# Patient Record
Sex: FEMALE | Race: ASIAN | NOT HISPANIC OR LATINO
[De-identification: names, ages, dates, MRNs, and addresses within clinical notes are randomized per-mention and may not be internally consistent; named-entity substitution may affect disease eponyms.]

---

## 2020-02-21 PROBLEM — Z00.00 ENCOUNTER FOR PREVENTIVE HEALTH EXAMINATION: Status: ACTIVE | Noted: 2020-02-21

## 2020-02-28 ENCOUNTER — APPOINTMENT (OUTPATIENT)
Dept: OTOLARYNGOLOGY | Facility: CLINIC | Age: 25
End: 2020-02-28
Payer: COMMERCIAL

## 2020-02-28 VITALS
BODY MASS INDEX: 20.16 KG/M2 | DIASTOLIC BLOOD PRESSURE: 85 MMHG | HEIGHT: 65 IN | SYSTOLIC BLOOD PRESSURE: 130 MMHG | TEMPERATURE: 98.2 F | HEART RATE: 67 BPM | WEIGHT: 121 LBS | OXYGEN SATURATION: 98 %

## 2020-02-28 DIAGNOSIS — H61.20 IMPACTED CERUMEN, UNSPECIFIED EAR: ICD-10-CM

## 2020-02-28 PROCEDURE — 99203 OFFICE O/P NEW LOW 30 MIN: CPT | Mod: 25

## 2020-02-28 PROCEDURE — 69210 REMOVE IMPACTED EAR WAX UNI: CPT

## 2020-02-28 NOTE — HISTORY OF PRESENT ILLNESS
[de-identified] : 24F who is seen for cerumen impaction, who presents for evaluation of cerumen impaction. PAtient denies any hearing loss, tinnitus, otorrhea, dizziness. She denies any other ENT issues. No pertinent SH/FH.

## 2020-02-28 NOTE — PHYSICAL EXAM
[de-identified] : bilateral cerumen impacted and obstructing the TMs, removed [Midline] : trachea located in midline position [Normal] : no rashes

## 2023-04-02 ENCOUNTER — NON-APPOINTMENT (OUTPATIENT)
Age: 28
End: 2023-04-02

## 2023-04-03 ENCOUNTER — NON-APPOINTMENT (OUTPATIENT)
Age: 28
End: 2023-04-03

## 2023-04-03 ENCOUNTER — APPOINTMENT (OUTPATIENT)
Dept: OTOLARYNGOLOGY | Facility: CLINIC | Age: 28
End: 2023-04-03
Payer: COMMERCIAL

## 2023-04-03 ENCOUNTER — TRANSCRIPTION ENCOUNTER (OUTPATIENT)
Age: 28
End: 2023-04-03

## 2023-04-03 VITALS
WEIGHT: 120 LBS | DIASTOLIC BLOOD PRESSURE: 77 MMHG | OXYGEN SATURATION: 98 % | TEMPERATURE: 98 F | SYSTOLIC BLOOD PRESSURE: 117 MMHG | HEART RATE: 74 BPM | BODY MASS INDEX: 19.99 KG/M2 | HEIGHT: 65 IN

## 2023-04-03 DIAGNOSIS — H61.23 IMPACTED CERUMEN, BILATERAL: ICD-10-CM

## 2023-04-03 PROCEDURE — 99203 OFFICE O/P NEW LOW 30 MIN: CPT | Mod: 25

## 2023-04-03 PROCEDURE — 69210 REMOVE IMPACTED EAR WAX UNI: CPT

## 2023-04-03 NOTE — PHYSICAL EXAM
[de-identified] :   Bilateral cerumen impaction cleaned away without issue [Midline] : trachea located in midline position [Normal] : no rashes

## 2023-04-03 NOTE — PROCEDURE
[FreeTextEntry3] : -\par Cerumen Removal/Ear Cleaning for Otitis Externa\par Pre-operative Diagnosis: bilateral Cerumen Impaction\par Post-operative Diagnosis: Same\par Procedure:  Binocular microscopy with cerumen removal- [id number]\par Procedure Details:  \par The patient was placed in the supine position.  The operating microscope was positioned.  I then placed the ear speculum in the EAC.  Cerumen was then removed using a mixture of otologic curettes, and suction.  The TM was noted to be intact. I then performed the procedure of the opposite ear in similar fashion.  The patient tolerated procedure well.\par \par Findings: \par Bilateral Ear Canal - normal\par Bilateral Tympanic Membrane - normal\par \par Recommendations: Debrox\par Complications: None\par \par

## 2023-04-03 NOTE — ASSESSMENT
[FreeTextEntry1] :  27-year-old female who presents with concern for cerumen impaction.  On exam there was evidence of bilateral cerumen and this was cleaned away without issue.  Patient noted immediate improvement back to baseline.  At this time I am recommending Debrox as needed and follow-up as needed.\par \par –Debrox as needed\par – Follow-up as needed

## 2023-04-03 NOTE — HISTORY OF PRESENT ILLNESS
[de-identified] : 4/3/23\par  27-year-old female who presents with concern for cerumen impaction.  She has had her ears cleaned in the past.  Currently denies any significant changes in hearing, tinnitus, vertiginous symptoms, otorrhea or otalgia.  She does not use Q-tips.  No Debrox use.  No significant loud noise exposure.  No ENT issues otherwise.\par